# Patient Record
Sex: FEMALE | Race: WHITE
[De-identification: names, ages, dates, MRNs, and addresses within clinical notes are randomized per-mention and may not be internally consistent; named-entity substitution may affect disease eponyms.]

---

## 2017-07-27 ENCOUNTER — HOSPITAL ENCOUNTER (OUTPATIENT)
Dept: HOSPITAL 58 - LAB | Age: 47
Discharge: HOME | End: 2017-07-27
Attending: SPECIALIST

## 2017-07-27 DIAGNOSIS — F41.8: Primary | ICD-10-CM

## 2017-07-27 DIAGNOSIS — Z86.59: ICD-10-CM

## 2017-07-27 LAB
ALBUMIN SERPL-MCNC: 3.7 G/DL (ref 3.4–5)
ALBUMIN/GLOB SERPL: 1.03 {RATIO}
ALP SERPL-CCNC: 71 U/L (ref 42–98)
ALT SERPL-CCNC: 11 U/L (ref 12–78)
ANION GAP SERPL CALC-SCNC: 13.4 MMOL/L
AST SERPL-CCNC: 15 U/L (ref 15–37)
BASOPHILS # BLD AUTO: 0 K/UL (ref 0–0.2)
BASOPHILS NFR BLD AUTO: 0.5 % (ref 0–3)
BILIRUB SERPL-MCNC: 0.31 MG/DL (ref 0–1.2)
BUN SERPL-MCNC: 10 MG/DL (ref 7–18)
BUN/CREAT SERPL: 12.65
CALCIUM SERPL-MCNC: 9.3 MG/DL (ref 8.2–10.2)
CHLORIDE SERPL-SCNC: 105 MMOL/L (ref 98–107)
CO2 BLD-SCNC: 27 MMOL/L (ref 21–32)
CREAT SERPL-MCNC: 0.79 MG/DL (ref 0.6–1.3)
EOSINOPHIL # BLD AUTO: 0.1 K/UL (ref 0–0.7)
EOSINOPHIL NFR BLD AUTO: 1.8 % (ref 0–7)
GFR SERPLBLD BASED ON 1.73 SQ M-ARVRAT: 78 ML/MIN
GLOBULIN SER CALC-MCNC: 3.6 G/L
GLUCOSE SERPL-MCNC: 94 MG/DL (ref 70–110)
HCT VFR BLD AUTO: 40.4 % (ref 37–47)
HGB BLD-MCNC: 13.7 G/DL (ref 12–16)
IMM GRANULOCYTES NFR BLD AUTO: 0.1 % (ref 0–5)
LYMPHOCYTES # SPEC AUTO: 2 K/UL (ref 0.6–3.4)
LYMPHOCYTES NFR BLD AUTO: 26.6 % (ref 10–50)
MCH RBC QN: 31.4 PG (ref 27–31)
MCHC RBC AUTO-ENTMCNC: 33.9 G/DL (ref 31.8–35.4)
MCV RBC: 92.4 FL (ref 81–99)
MONOCYTES # BLD AUTO: 0.6 K/UL (ref 0.4–2)
MONOCYTES NFR BLD AUTO: 7.6 % (ref 0–10)
NEUTROPHILS # BLD AUTO: 4.9 K/UL (ref 2–6.9)
NEUTROPHILS NFR BLD AUTO: 63.4 %
PLATELET # BLD AUTO: 222 10^3/UL (ref 140–440)
POTASSIUM SERPL-SCNC: 4.4 MMOL/L (ref 3.5–5.1)
PROT SERPL-MCNC: 7.3 G/DL (ref 6.4–8.2)
RBC # BLD AUTO: 4.37 10^6/UL (ref 4.2–5.4)
SODIUM SERPL-SCNC: 141 MMOL/L (ref 136–145)
WBC # BLD AUTO: 7.67 K/UL (ref 4.6–10.2)

## 2017-07-27 PROCEDURE — 80053 COMPREHEN METABOLIC PANEL: CPT

## 2017-07-27 PROCEDURE — 85025 COMPLETE CBC W/AUTO DIFF WBC: CPT

## 2017-07-27 PROCEDURE — 36415 COLL VENOUS BLD VENIPUNCTURE: CPT

## 2017-07-27 PROCEDURE — 84443 ASSAY THYROID STIM HORMONE: CPT

## 2017-07-27 PROCEDURE — 84439 ASSAY OF FREE THYROXINE: CPT

## 2018-11-05 ENCOUNTER — HOSPITAL ENCOUNTER (OUTPATIENT)
Dept: HOSPITAL 58 - RAD | Age: 48
Discharge: HOME | End: 2018-11-05
Attending: NURSE PRACTITIONER

## 2018-11-05 VITALS — BODY MASS INDEX: 23.8 KG/M2

## 2018-11-05 DIAGNOSIS — R11.0: ICD-10-CM

## 2018-11-05 DIAGNOSIS — R10.11: Primary | ICD-10-CM

## 2018-11-05 NOTE — NM
EXAM: Hepatobiliary imaging 

  

HISTORY: Right upper quadrant pain 

  

COMPARISON: Right upper quadrant ultrasound on 09/05/2018 showed no abnormality. 

  

TECHNIQUE: Patient was injected 5 mCi of technetium 99m mebrofenin intravenously.  Multiple anterior 
scintigraphic images of the right upper quadrant region of the abdomen were obtained up to 1 hour int
erval.  Patient was subsequently given fatty meal.  Gallbladder ejection fraction was calculated. 

  

FINDINGS: There is normal visualization of liver, gallbladder, bile duct and small bowel loops.  Gall
bladder ejection fraction is 17%. 

  

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